# Patient Record
Sex: FEMALE
[De-identification: names, ages, dates, MRNs, and addresses within clinical notes are randomized per-mention and may not be internally consistent; named-entity substitution may affect disease eponyms.]

---

## 2021-01-18 ENCOUNTER — TRANSCRIPTION ENCOUNTER (OUTPATIENT)
Age: 26
End: 2021-01-18

## 2021-03-22 PROBLEM — Z00.00 ENCOUNTER FOR PREVENTIVE HEALTH EXAMINATION: Status: ACTIVE | Noted: 2021-03-22

## 2021-04-13 ENCOUNTER — TRANSCRIPTION ENCOUNTER (OUTPATIENT)
Age: 26
End: 2021-04-13

## 2021-04-23 ENCOUNTER — LABORATORY RESULT (OUTPATIENT)
Age: 26
End: 2021-04-23

## 2021-04-23 ENCOUNTER — APPOINTMENT (OUTPATIENT)
Dept: CARDIOLOGY | Facility: CLINIC | Age: 26
End: 2021-04-23
Payer: MEDICAID

## 2021-04-23 VITALS
BODY MASS INDEX: 17.9 KG/M2 | HEART RATE: 94 BPM | DIASTOLIC BLOOD PRESSURE: 74 MMHG | SYSTOLIC BLOOD PRESSURE: 108 MMHG | HEIGHT: 70 IN | WEIGHT: 125 LBS | OXYGEN SATURATION: 100 %

## 2021-04-23 DIAGNOSIS — R29.91 UNSPECIFIED SYMPTOMS AND SIGNS INVOLVING THE MUSCULOSKELETAL SYSTEM: ICD-10-CM

## 2021-04-23 DIAGNOSIS — Z82.41 FAMILY HISTORY OF SUDDEN CARDIAC DEATH: ICD-10-CM

## 2021-04-23 DIAGNOSIS — Z82.49 FAMILY HISTORY OF ISCHEMIC HEART DISEASE AND OTHER DISEASES OF THE CIRCULATORY SYSTEM: ICD-10-CM

## 2021-04-23 PROCEDURE — 99072 ADDL SUPL MATRL&STAF TM PHE: CPT

## 2021-04-23 PROCEDURE — 99205 OFFICE O/P NEW HI 60 MIN: CPT

## 2021-04-23 NOTE — REASON FOR VISIT
[FreeTextEntry3] : ARDEN DYER  is being seen  for an initial consultation at the Cardi[facility] at [facility] on 04/23/2021.   Ms. DYER was referred by Dr Ko for hereditary cardiac predisposition risk assessment and counseling, due to her marfanoid habitus and family history of aortic aneurysm\par \par

## 2021-04-23 NOTE — HISTORY OF PRESENT ILLNESS
[FreeTextEntry1] : ARDEN DYER is a 26 yo F no known medical probs\par recent loss of her mother hx HTN due to aortic aneurysm\par states a recent echo nml\par today she is referred for a cardiogenomic evaluation

## 2021-04-23 NOTE — PHYSICAL EXAM
[Total Score ___] : Total Score = [unfilled] [Normal] : no mass, no hepatosplenomegaly [de-identified] : velverty skin,  [de-identified] : small mouth and chin [de-identified] : single uvual , high palate [de-identified] : higher right side, back mild asymetry, no scoliosi [de-identified] : long limbs [de-identified] : long flat feet and long toes, elongated fingers [FreeTextEntry1] : 10 [FreeTextEntry2] : 183 [FreeTextEntry3] : 188 [FreeTextEntry6] : 102 [TWNoteComboBox1] : 3 [TWNoteComboBox3] : 2 [TWNoteComboBox4] : 1 [TWNoteComboBox5] : 2 [TWNoteComboBox6] : 1 [TWNoteComboBox7] : 0 [de-identified] : 1 [de-identified] : 0 [de-identified] : 0 [Right] : Right: N [Left] : Left: N [] : No

## 2021-04-23 NOTE — FAMILY HISTORY
[FreeTextEntry1] : FamilyHistory_20_twCiteListControlStart FamilyHistory_20_twCiteListControlEnd [id number] \par A four-generation family history was constructed and scanned into [employer]. \par Family history is significant for: \par siblings\par 17 yo brother -healthy\par 36yo half brother shared father - healthy \par \par Mother dec 57 yo hx aortic dissection, sudden, HTN\par Maternal aunts\par  dec 50 yo hx scleroderma- 2 daughters 24 yo , 15 yo)  and 1 son  22 yo \par dec 44 yo hx heart prob and open heart surgery in 40s- no children\par 50s - 3 sons (27 yo, 24 yo, 19 yo) and 17 yo daughter\par Maternal uncles\par dec med hx unk\par dec med hx unk\par 60s healthy- 2 sons and 1 daughter healthy\par Maternal Grandmother 86 yo healthy\par Maternal Grandfather dec in 50s,  in sleep \par \par Father 75 yo- hx parkinsons, stable 15 yrs\par Paternal aunts x2\par dec 77 yo dementia -3 sons , one dec mental illness accidental OD, \par 80s -1 son and 1 daughter 40s\par Paternal uncles x2\par dec 60s dementia -multiple children unk\par 80s -2 sons and 2 daughters healthy\par Paternal Grandfather dec 94yo\par Paternal Grandmother dec 60s unk\par \par children: none\par \par her maternal families originate from [facility] and paternal families originate from [facility]. \par No Ashkenazi Mormonism ancestry. \par Family history was negative for consanguinity  \par No family history of SIDS\par  \par \par

## 2021-06-07 ENCOUNTER — APPOINTMENT (OUTPATIENT)
Dept: CARDIOLOGY | Facility: CLINIC | Age: 26
End: 2021-06-07
Payer: MEDICAID

## 2021-06-07 VITALS
HEIGHT: 70 IN | OXYGEN SATURATION: 98 % | BODY MASS INDEX: 17.9 KG/M2 | DIASTOLIC BLOOD PRESSURE: 77 MMHG | HEART RATE: 82 BPM | WEIGHT: 125 LBS | SYSTOLIC BLOOD PRESSURE: 111 MMHG

## 2021-06-07 PROCEDURE — 99215 OFFICE O/P EST HI 40 MIN: CPT

## 2021-06-07 PROCEDURE — 93000 ELECTROCARDIOGRAM COMPLETE: CPT

## 2021-06-14 NOTE — FAMILY HISTORY
[FreeTextEntry1] : FamilyHistory_20_twCiteListControlStart FamilyHistory_20_twCiteListControlEnd [id number] \par A four-generation family history was constructed and scanned into [employer]. \par Family history is significant for: \par siblings\par 19 yo brother -healthy\par 34yo half brother shared father - healthy \par \par Mother dec 57 yo hx aortic dissection, sudden, HTN\par Maternal aunts\par  dec 50 yo hx scleroderma- 2 daughters 26 yo , 13 yo)  and 1 son  20 yo \par dec 44 yo hx heart prob and open heart surgery in 40s- no children\par 50s - 3 sons (25 yo, 26 yo, 21 yo) and 19 yo daughter\par Maternal uncles\par dec med hx unk\par dec med hx unk\par 60s healthy- 2 sons and 1 daughter healthy\par Maternal Grandmother 86 yo healthy\par Maternal Grandfather dec in 50s,  in sleep \par \par Father 73 yo- hx parkinsons, stable 15 yrs\par Paternal aunts x2\par dec 75 yo dementia -3 sons , one dec mental illness accidental OD, \par 80s -1 son and 1 daughter 40s\par Paternal uncles x2\par dec 60s dementia -multiple children unk\par 80s -2 sons and 2 daughters healthy\par Paternal Grandfather dec 94yo\par Paternal Grandmother dec 60s unk\par \par children: none\par \par her maternal families originate from [facility] and paternal families originate from [facility]. \par No Ashkenazi Taoist ancestry. \par Family history was negative for consanguinity  \par No family history of SIDS\par  \par \par

## 2021-06-14 NOTE — PHYSICAL EXAM
[Normal] : orientated to person, place, and time [Total Score ___] : Total Score = [unfilled] [de-identified] : velverty skin,  [de-identified] : small mouth and chin [de-identified] : single uvual , high palate [de-identified] : higher right side, back mild asymetry, no scoliosi [de-identified] : long limbs [de-identified] : long flat feet and long toes, elongated fingers [FreeTextEntry1] : 10 [FreeTextEntry2] : 183 [FreeTextEntry3] : 188 [FreeTextEntry6] : 102 [TWNoteComboBox1] : 3 [TWNoteComboBox3] : 2 [TWNoteComboBox4] : 1 [TWNoteComboBox5] : 2 [TWNoteComboBox6] : 1 [TWNoteComboBox7] : 0 [de-identified] : 1 [de-identified] : 0 [de-identified] : 0 [Right] : Right: N [Left] : Left: N [] : No

## 2021-06-14 NOTE — HISTORY OF PRESENT ILLNESS
[FreeTextEntry1] : ARDEN DYER is a 26 yo F no known medical probs\par recent loss of her mother hx HTN due to aortic aneurysm\par states a recent echo nml\par she underwent genetic testing and today presents for results

## 2021-06-14 NOTE — REASON FOR VISIT
[FreeTextEntry3] : ARDEN DYER  was seen  for an initial consultation at the Cardiogenomics Program at [facility] on 04/23/2021.   Ms. DYER was referred by Dr Autumn Ko for hereditary cardiac predisposition risk assessment and counseling, due to her marfanoid habitus and family history of aortic aneurysm\par \par

## 2021-08-01 ENCOUNTER — TRANSCRIPTION ENCOUNTER (OUTPATIENT)
Age: 26
End: 2021-08-01

## 2022-01-20 ENCOUNTER — APPOINTMENT (OUTPATIENT)
Dept: OBGYN | Facility: CLINIC | Age: 27
End: 2022-01-20
Payer: MEDICAID

## 2022-01-20 VITALS
DIASTOLIC BLOOD PRESSURE: 87 MMHG | SYSTOLIC BLOOD PRESSURE: 133 MMHG | BODY MASS INDEX: 17.61 KG/M2 | HEIGHT: 70 IN | HEART RATE: 93 BPM | WEIGHT: 123 LBS

## 2022-01-20 DIAGNOSIS — Z01.419 ENCOUNTER FOR GYNECOLOGICAL EXAMINATION (GENERAL) (ROUTINE) W/OUT ABNORMAL FINDINGS: ICD-10-CM

## 2022-01-20 DIAGNOSIS — Q87.43 MARFAN'S SYNDROME WITH SKELETAL MANIFESTATION: ICD-10-CM

## 2022-01-20 PROCEDURE — 99385 PREV VISIT NEW AGE 18-39: CPT

## 2022-01-22 PROBLEM — Q87.43 MARFANOID HYPERMOBILITY SYNDROME: Status: ACTIVE | Noted: 2021-04-23

## 2022-01-22 NOTE — HISTORY OF PRESENT ILLNESS
[FreeTextEntry1] : 27yo P0 LMP  12/22/21 presents for initial gyn annual exam.  Pt with h/o Marfan's syndrome,\par Reports menses q 1-2 months. Never SA.  No gyn c/o. [N] : Patient is not sexually active

## 2022-02-10 ENCOUNTER — TRANSCRIPTION ENCOUNTER (OUTPATIENT)
Age: 27
End: 2022-02-10

## 2022-03-20 LAB — CYTOLOGY CVX/VAG DOC THIN PREP: ABNORMAL

## 2023-06-08 ENCOUNTER — APPOINTMENT (OUTPATIENT)
Dept: OBGYN | Facility: CLINIC | Age: 28
End: 2023-06-08

## 2023-08-29 ENCOUNTER — APPOINTMENT (OUTPATIENT)
Dept: OBGYN | Facility: CLINIC | Age: 28
End: 2023-08-29
Payer: MEDICAID

## 2023-08-29 VITALS
DIASTOLIC BLOOD PRESSURE: 85 MMHG | BODY MASS INDEX: 17.47 KG/M2 | WEIGHT: 122 LBS | HEIGHT: 70 IN | HEART RATE: 86 BPM | SYSTOLIC BLOOD PRESSURE: 125 MMHG

## 2023-08-29 DIAGNOSIS — Z01.419 ENCOUNTER FOR GYNECOLOGICAL EXAMINATION (GENERAL) (ROUTINE) W/OUT ABNORMAL FINDINGS: ICD-10-CM

## 2023-08-29 PROCEDURE — 99395 PREV VISIT EST AGE 18-39: CPT

## 2024-09-03 ENCOUNTER — APPOINTMENT (OUTPATIENT)
Dept: OBGYN | Facility: CLINIC | Age: 29
End: 2024-09-03